# Patient Record
Sex: FEMALE | Race: OTHER | NOT HISPANIC OR LATINO | URBAN - METROPOLITAN AREA
[De-identification: names, ages, dates, MRNs, and addresses within clinical notes are randomized per-mention and may not be internally consistent; named-entity substitution may affect disease eponyms.]

---

## 2018-01-19 ENCOUNTER — EMERGENCY (EMERGENCY)
Facility: HOSPITAL | Age: 48
LOS: 1 days | Discharge: HOME | End: 2018-01-19
Attending: EMERGENCY MEDICINE | Admitting: EMERGENCY MEDICINE
Payer: OTHER MISCELLANEOUS

## 2018-01-19 VITALS
SYSTOLIC BLOOD PRESSURE: 152 MMHG | OXYGEN SATURATION: 100 % | TEMPERATURE: 99 F | DIASTOLIC BLOOD PRESSURE: 87 MMHG | RESPIRATION RATE: 16 BRPM | HEART RATE: 95 BPM

## 2018-01-19 PROCEDURE — 73590 X-RAY EXAM OF LOWER LEG: CPT | Mod: 26,LT

## 2018-01-19 PROCEDURE — 99284 EMERGENCY DEPT VISIT MOD MDM: CPT

## 2018-01-19 PROCEDURE — 73562 X-RAY EXAM OF KNEE 3: CPT | Mod: 26,LT

## 2018-01-19 PROCEDURE — 73564 X-RAY EXAM KNEE 4 OR MORE: CPT | Mod: 26,LT

## 2018-01-19 PROCEDURE — 73610 X-RAY EXAM OF ANKLE: CPT | Mod: 26,LT

## 2018-01-19 RX ORDER — IBUPROFEN 200 MG
600 TABLET ORAL ONCE
Qty: 0 | Refills: 0 | Status: COMPLETED | OUTPATIENT
Start: 2018-01-19 | End: 2018-01-19

## 2018-01-19 RX ORDER — ACETAMINOPHEN 500 MG
650 TABLET ORAL ONCE
Qty: 0 | Refills: 0 | Status: COMPLETED | OUTPATIENT
Start: 2018-01-19 | End: 2018-01-19

## 2018-01-19 RX ADMIN — Medication 650 MILLIGRAM(S): at 12:26

## 2018-01-19 RX ADMIN — Medication 600 MILLIGRAM(S): at 12:26

## 2018-01-19 NOTE — ED PROVIDER NOTE - OBJECTIVE STATEMENT
47 year old female with no significant PMHx, presents to the ER after falling down 2 steps. The patient works as a  and was at a clients house when she tripped down 2 steps. She fell forward and felt severe pain in her left leg. She did not hit her head and denies LOC. She denies chills, vomiting, nausea, or any other complaint.

## 2018-01-19 NOTE — ED PROVIDER NOTE - MEDICAL DECISION MAKING DETAILS
47 year old female with left knee pain. We will get a left knee Xray and give pain meds and reassess.

## 2018-01-19 NOTE — ED PROVIDER NOTE - CARE PLAN
Principal Discharge DX:	Knee pain  Assessment and plan of treatment:	Rest, drink plenty of fluids.  Advance activity as tolerated.  Continue all previously prescribed medications as directed.  Take Motrin 600 mg (three 200 mg over the counter pills) every 8 hours as needed for moderate pain -- take with food. Take Tylenol 650mg (Two 325 mg pills) every 4-6 hours as needed for pain. Keep extremity elevated and wrapped.  Apply cool compresses to affected area for 15 minutes, 3-4 times per day. Ambulate with crutches as shown to you in the emergency department. Follow up with your primary care physician and orthopedics (referral list provided) in 48-72 hours- bring copies of your results.  Return to the ER for worsening or persistent symptoms, and/or ANY NEW OR CONCERNING SYMPTOMS. If you have issues obtaining follow up, please call: [phone] to obtain a doctor or specialist who takes your insurance in your area.

## 2018-01-19 NOTE — ED PROVIDER NOTE - PROGRESS NOTE DETAILS
HONORIO OCHOA:  Xray shows no acute pathology.  ACE applied.  Crutch education provided.  Pt able to ambulate with crutches independently.  Pt medically stable for discharge. Pt to follow up with PMD and orthopedics (referral list provided).

## 2018-01-19 NOTE — ED PROVIDER NOTE - PLAN OF CARE
Rest, drink plenty of fluids.  Advance activity as tolerated.  Continue all previously prescribed medications as directed.  Take Motrin 600 mg (three 200 mg over the counter pills) every 8 hours as needed for moderate pain -- take with food. Take Tylenol 650mg (Two 325 mg pills) every 4-6 hours as needed for pain. Keep extremity elevated and wrapped.  Apply cool compresses to affected area for 15 minutes, 3-4 times per day. Ambulate with crutches as shown to you in the emergency department. Follow up with your primary care physician and orthopedics (referral list provided) in 48-72 hours- bring copies of your results.  Return to the ER for worsening or persistent symptoms, and/or ANY NEW OR CONCERNING SYMPTOMS. If you have issues obtaining follow up, please call: [phone] to obtain a doctor or specialist who takes your insurance in your area.

## 2018-01-19 NOTE — ED ADULT TRIAGE NOTE - CHIEF COMPLAINT QUOTE
Arrives via EMS s/p fall while going down a few steps, pt's right foot went into a ditch and twisted left leg.

## 2020-01-01 NOTE — ED ADULT TRIAGE NOTE - MODE OF ARRIVAL
EMS
Alveolar ridge smooth and edentulous/Lip, palate and uvula with acceptable anatomic shape/Normal tongue, frenulum and cheek/Mandible size acceptable/Mucous membranes moist and pink without lesions

## 2023-01-12 NOTE — ED PROVIDER NOTE - CONSTITUTIONAL, MLM
PRE-SEDATION ASSESSMENT    PRE-SEDATION INFORMATION    Indications for Procedure :  Evaluate arrhythma, thrombus    Possible Pregnancy: n/a  Cardiac History:  Arrhythmia, CAD, HTN, lipid;  Respiratory History:  No new changes; COPD.  Mental Status: Awake, alert oriented    Risk Status ASA:  III    Class I - Normal, healthy patient  Class II - Normal patient with mild systemic disease  Class III - Severe systemic disease, limits activity, is not incapacitated  Class IV - Incapacitating systemic disease that is a constant threat to life  Class V - A moribund patient not expected to survive 24 hours with or without procedure     Airway Risk history: At risk for apnea.  Modified Mallampati Airway  Score Type I    Versed and Fentanyl are to be used during this procedure for moderate sedation.  This risks and benefits of this were discussed with the patient.    I reviewed the H&P (by EP provider), I examined the patient are there are no changes, or the changes are:as noted as well above for pre-procedure evaluation.    -Hermelindo Rand MD  Cardiology   normal... Well appearing, well nourished, awake, alert, oriented to person, place, time/situation and in no apparent distress.